# Patient Record
Sex: MALE | Race: WHITE | Employment: FULL TIME | URBAN - METROPOLITAN AREA
[De-identification: names, ages, dates, MRNs, and addresses within clinical notes are randomized per-mention and may not be internally consistent; named-entity substitution may affect disease eponyms.]

---

## 2023-06-02 ENCOUNTER — HOSPITAL ENCOUNTER (INPATIENT)
Age: 58
LOS: 1 days | Discharge: HOME | DRG: 918 | End: 2023-06-03
Attending: EMERGENCY MEDICINE | Admitting: FAMILY MEDICINE
Payer: COMMERCIAL

## 2023-06-02 ENCOUNTER — APPOINTMENT (OUTPATIENT)
Dept: GENERAL RADIOLOGY | Age: 58
DRG: 918 | End: 2023-06-02
Payer: COMMERCIAL

## 2023-06-02 DIAGNOSIS — N17.9 AKI (ACUTE KIDNEY INJURY) (HCC): ICD-10-CM

## 2023-06-02 DIAGNOSIS — T50.901A ACCIDENTAL OVERDOSE, INITIAL ENCOUNTER: Primary | ICD-10-CM

## 2023-06-02 DIAGNOSIS — E87.5 HYPERKALEMIA: ICD-10-CM

## 2023-06-02 LAB
AMPHET UR QL SCN: NEGATIVE
ANION GAP SERPL CALCULATED.3IONS-SCNC: 12 MMOL/L (ref 9–17)
BARBITURATES UR QL SCN: NEGATIVE
BASOPHILS # BLD: 0 K/UL (ref 0–0.2)
BASOPHILS NFR BLD: 1 % (ref 0–2)
BENZODIAZ UR QL: NEGATIVE
BUN SERPL-MCNC: 13 MG/DL (ref 6–20)
CALCIUM SERPL-MCNC: 8.5 MG/DL (ref 8.6–10.4)
CANNABINOID SCREEN URINE: POSITIVE
CHLORIDE SERPL-SCNC: 98 MMOL/L (ref 98–107)
CO2 SERPL-SCNC: 23 MMOL/L (ref 20–31)
COCAINE UR QL SCN: POSITIVE
CREAT SERPL-MCNC: 1.44 MG/DL (ref 0.7–1.2)
EKG ATRIAL RATE: 77 BPM
EKG P AXIS: 69 DEGREES
EKG P-R INTERVAL: 150 MS
EKG Q-T INTERVAL: 420 MS
EKG QRS DURATION: 100 MS
EKG QTC CALCULATION (BAZETT): 475 MS
EKG R AXIS: 55 DEGREES
EKG T AXIS: 63 DEGREES
EKG VENTRICULAR RATE: 77 BPM
EOSINOPHIL # BLD: 0 K/UL (ref 0–0.4)
EOSINOPHILS RELATIVE PERCENT: 0 % (ref 0–4)
ERYTHROCYTE [DISTWIDTH] IN BLOOD BY AUTOMATED COUNT: 13.5 % (ref 11.5–14.9)
FENTANYL URINE: POSITIVE
GFR SERPL CREATININE-BSD FRML MDRD: 57 ML/MIN/1.73M2
GLUCOSE BLD-MCNC: 171 MG/DL (ref 75–110)
GLUCOSE BLD-MCNC: 72 MG/DL (ref 75–110)
GLUCOSE BLD-MCNC: 89 MG/DL (ref 75–110)
GLUCOSE BLD-MCNC: 94 MG/DL (ref 75–110)
GLUCOSE SERPL-MCNC: 268 MG/DL (ref 70–99)
HCT VFR BLD AUTO: 46.5 % (ref 41–53)
HGB BLD-MCNC: 15.4 G/DL (ref 13.5–17.5)
INR PPP: 1
LYMPHOCYTES # BLD: 11 % (ref 24–44)
LYMPHOCYTES NFR BLD: 0.6 K/UL (ref 1–4.8)
MCH RBC QN AUTO: 29.5 PG (ref 26–34)
MCHC RBC AUTO-ENTMCNC: 33.1 G/DL (ref 31–37)
MCV RBC AUTO: 89.1 FL (ref 80–100)
METHADONE SCREEN, URINE: NEGATIVE
MONOCYTES NFR BLD: 0.2 K/UL (ref 0.1–1.3)
MONOCYTES NFR BLD: 3 % (ref 1–7)
MYOGLOBIN SERPL-MCNC: 102 NG/ML (ref 28–72)
NEUTROPHILS NFR BLD: 85 % (ref 36–66)
NEUTS SEG NFR BLD: 4.3 K/UL (ref 1.3–9.1)
OPIATES UR QL SCN: NEGATIVE
OXYCODONE SCREEN URINE: NEGATIVE
PARTIAL THROMBOPLASTIN TIME: 27.3 SEC (ref 24–36)
PCP UR QL SCN: NEGATIVE
PLATELET # BLD AUTO: 254 K/UL (ref 150–450)
PMV BLD AUTO: 9 FL (ref 6–12)
POTASSIUM SERPL-SCNC: 5.9 MMOL/L (ref 3.7–5.3)
PROTHROMBIN TIME: 13.6 SEC (ref 11.8–14.6)
RBC # BLD AUTO: 5.22 M/UL (ref 4.5–5.9)
SODIUM SERPL-SCNC: 133 MMOL/L (ref 135–144)
T4 FREE SERPL-MCNC: 1.1 NG/DL (ref 0.9–1.7)
TEST INFORMATION: ABNORMAL
TROPONIN I SERPL HS-MCNC: 20 NG/L (ref 0–22)
TSH SERPL-MCNC: 11.31 UIU/ML (ref 0.3–5)
WBC OTHER # BLD: 5.1 K/UL (ref 3.5–11)

## 2023-06-02 PROCEDURE — 2580000003 HC RX 258: Performed by: EMERGENCY MEDICINE

## 2023-06-02 PROCEDURE — 96374 THER/PROPH/DIAG INJ IV PUSH: CPT

## 2023-06-02 PROCEDURE — 96361 HYDRATE IV INFUSION ADD-ON: CPT

## 2023-06-02 PROCEDURE — 82947 ASSAY GLUCOSE BLOOD QUANT: CPT

## 2023-06-02 PROCEDURE — 2580000003 HC RX 258: Performed by: FAMILY MEDICINE

## 2023-06-02 PROCEDURE — 83874 ASSAY OF MYOGLOBIN: CPT

## 2023-06-02 PROCEDURE — 84484 ASSAY OF TROPONIN QUANT: CPT

## 2023-06-02 PROCEDURE — 6360000002 HC RX W HCPCS: Performed by: EMERGENCY MEDICINE

## 2023-06-02 PROCEDURE — 99285 EMERGENCY DEPT VISIT HI MDM: CPT

## 2023-06-02 PROCEDURE — 36415 COLL VENOUS BLD VENIPUNCTURE: CPT

## 2023-06-02 PROCEDURE — 84439 ASSAY OF FREE THYROXINE: CPT

## 2023-06-02 PROCEDURE — 6370000000 HC RX 637 (ALT 250 FOR IP): Performed by: FAMILY MEDICINE

## 2023-06-02 PROCEDURE — 80048 BASIC METABOLIC PNL TOTAL CA: CPT

## 2023-06-02 PROCEDURE — 5A12012 PERFORMANCE OF CARDIAC OUTPUT, SINGLE, MANUAL: ICD-10-PCS | Performed by: FAMILY MEDICINE

## 2023-06-02 PROCEDURE — 85730 THROMBOPLASTIN TIME PARTIAL: CPT

## 2023-06-02 PROCEDURE — 6370000000 HC RX 637 (ALT 250 FOR IP): Performed by: EMERGENCY MEDICINE

## 2023-06-02 PROCEDURE — 80307 DRUG TEST PRSMV CHEM ANLYZR: CPT

## 2023-06-02 PROCEDURE — 2060000000 HC ICU INTERMEDIATE R&B

## 2023-06-02 PROCEDURE — 85027 COMPLETE CBC AUTOMATED: CPT

## 2023-06-02 PROCEDURE — 84443 ASSAY THYROID STIM HORMONE: CPT

## 2023-06-02 PROCEDURE — 93005 ELECTROCARDIOGRAM TRACING: CPT

## 2023-06-02 PROCEDURE — 71045 X-RAY EXAM CHEST 1 VIEW: CPT

## 2023-06-02 PROCEDURE — 85610 PROTHROMBIN TIME: CPT

## 2023-06-02 RX ORDER — CLOTRIMAZOLE 1 %
CREAM (GRAM) TOPICAL 2 TIMES DAILY
Status: DISCONTINUED | OUTPATIENT
Start: 2023-06-02 | End: 2023-06-03 | Stop reason: HOSPADM

## 2023-06-02 RX ORDER — SUMATRIPTAN 25 MG/1
25 TABLET, FILM COATED ORAL
COMMUNITY

## 2023-06-02 RX ORDER — ACETAMINOPHEN 325 MG/1
650 TABLET ORAL EVERY 6 HOURS PRN
Status: DISCONTINUED | OUTPATIENT
Start: 2023-06-02 | End: 2023-06-03 | Stop reason: HOSPADM

## 2023-06-02 RX ORDER — LORAZEPAM 1 MG/1
1 TABLET ORAL DAILY
Status: DISCONTINUED | OUTPATIENT
Start: 2023-06-02 | End: 2023-06-03 | Stop reason: HOSPADM

## 2023-06-02 RX ORDER — SODIUM CHLORIDE 9 MG/ML
INJECTION, SOLUTION INTRAVENOUS CONTINUOUS
Status: DISCONTINUED | OUTPATIENT
Start: 2023-06-02 | End: 2023-06-03 | Stop reason: HOSPADM

## 2023-06-02 RX ORDER — 0.9 % SODIUM CHLORIDE 0.9 %
1000 INTRAVENOUS SOLUTION INTRAVENOUS ONCE
Status: COMPLETED | OUTPATIENT
Start: 2023-06-02 | End: 2023-06-02

## 2023-06-02 RX ORDER — NADOLOL 20 MG/1
20 TABLET ORAL DAILY
Status: DISCONTINUED | OUTPATIENT
Start: 2023-06-02 | End: 2023-06-03 | Stop reason: HOSPADM

## 2023-06-02 RX ORDER — CHLORHEXIDINE GLUCONATE 0.12 MG/ML
15 RINSE ORAL 2 TIMES DAILY
Status: ON HOLD | COMMUNITY
End: 2023-06-02

## 2023-06-02 RX ORDER — ONDANSETRON 2 MG/ML
4 INJECTION INTRAMUSCULAR; INTRAVENOUS ONCE
Status: COMPLETED | OUTPATIENT
Start: 2023-06-02 | End: 2023-06-02

## 2023-06-02 RX ORDER — LORAZEPAM 1 MG/1
1 TABLET ORAL DAILY
COMMUNITY

## 2023-06-02 RX ORDER — METOCLOPRAMIDE HYDROCHLORIDE 5 MG/ML
10 INJECTION INTRAMUSCULAR; INTRAVENOUS ONCE
Status: COMPLETED | OUTPATIENT
Start: 2023-06-02 | End: 2023-06-02

## 2023-06-02 RX ORDER — SUMATRIPTAN 25 MG/1
25 TABLET, FILM COATED ORAL
Status: COMPLETED | OUTPATIENT
Start: 2023-06-02 | End: 2023-06-02

## 2023-06-02 RX ORDER — NADOLOL 20 MG/1
20 TABLET ORAL DAILY
COMMUNITY

## 2023-06-02 RX ADMIN — METOCLOPRAMIDE 10 MG: 5 INJECTION, SOLUTION INTRAMUSCULAR; INTRAVENOUS at 13:51

## 2023-06-02 RX ADMIN — SUMATRIPTAN SUCCINATE 25 MG: 25 TABLET ORAL at 20:47

## 2023-06-02 RX ADMIN — SODIUM CHLORIDE 1000 ML: 9 INJECTION, SOLUTION INTRAVENOUS at 13:48

## 2023-06-02 RX ADMIN — LORAZEPAM 1 MG: 1 TABLET ORAL at 20:45

## 2023-06-02 RX ADMIN — INSULIN HUMAN 10 UNITS: 100 INJECTION, SOLUTION PARENTERAL at 13:47

## 2023-06-02 RX ADMIN — SODIUM CHLORIDE 1000 ML: 9 INJECTION, SOLUTION INTRAVENOUS at 12:56

## 2023-06-02 RX ADMIN — CLOTRIMAZOLE: 10 CREAM TOPICAL at 20:45

## 2023-06-02 RX ADMIN — DEXTROSE MONOHYDRATE 250 ML: 100 INJECTION, SOLUTION INTRAVENOUS at 13:49

## 2023-06-02 RX ADMIN — NADOLOL 20 MG: 20 TABLET ORAL at 20:45

## 2023-06-02 RX ADMIN — SODIUM CHLORIDE: 9 INJECTION, SOLUTION INTRAVENOUS at 20:51

## 2023-06-02 RX ADMIN — ONDANSETRON 4 MG: 2 INJECTION INTRAMUSCULAR; INTRAVENOUS at 12:54

## 2023-06-02 ASSESSMENT — ENCOUNTER SYMPTOMS
ABDOMINAL PAIN: 0
EYE DISCHARGE: 0
FACIAL SWELLING: 0
EYE PAIN: 0
RHINORRHEA: 0
BLOOD IN STOOL: 0
SHORTNESS OF BREATH: 0
EYE REDNESS: 0
COUGH: 0
CHEST TIGHTNESS: 0
SINUS PRESSURE: 0
NAUSEA: 1
VOMITING: 0
TROUBLE SWALLOWING: 0
WHEEZING: 0
DIARRHEA: 0
SORE THROAT: 0
BACK PAIN: 0
CONSTIPATION: 0
COLOR CHANGE: 0

## 2023-06-02 ASSESSMENT — PAIN SCALES - GENERAL: PAINLEVEL_OUTOF10: 9

## 2023-06-02 ASSESSMENT — LIFESTYLE VARIABLES
HOW MANY STANDARD DRINKS CONTAINING ALCOHOL DO YOU HAVE ON A TYPICAL DAY: 1 OR 2
HOW OFTEN DO YOU HAVE A DRINK CONTAINING ALCOHOL: MONTHLY OR LESS
HOW OFTEN DO YOU HAVE A DRINK CONTAINING ALCOHOL: NEVER
HOW MANY STANDARD DRINKS CONTAINING ALCOHOL DO YOU HAVE ON A TYPICAL DAY: PATIENT DOES NOT DRINK

## 2023-06-02 ASSESSMENT — PAIN DESCRIPTION - DESCRIPTORS: DESCRIPTORS: ACHING

## 2023-06-02 ASSESSMENT — PAIN DESCRIPTION - LOCATION: LOCATION: HEAD

## 2023-06-02 ASSESSMENT — PAIN - FUNCTIONAL ASSESSMENT: PAIN_FUNCTIONAL_ASSESSMENT: NONE - DENIES PAIN

## 2023-06-02 ASSESSMENT — PAIN DESCRIPTION - PAIN TYPE: TYPE: ACUTE PAIN

## 2023-06-02 NOTE — PROGRESS NOTES
Medication History completed:    New medications: chlorhexidine rinse, lorazepam, nadolol, sumatriptan    Medications discontinued: none    Medications flagged for review: none    Changes to dosing: none    Stated allergies: NKDA    Other pertinent information: Medications confirmed with [facility] dispense report and [facility]. Last fill of lorazepam was on 4/30/23 for 30 days.      Thank you,  Caleb Johnston, PharmD, BCPS  [phone]

## 2023-06-02 NOTE — PROGRESS NOTES
Pt arrived to floor via stretcher from ED and was transfered to bed. Vitals taken, telemetry applied. Assessment complete. No distress noted. See doc flowsheet and admission navigator for details. POC and education initiated and reviewed with patient. Call light within reach, and pt educated on its use. Bed in lowest position, and locked. Side rails up x 2. Denied further questions or needs at this time. Will continue to monitor.

## 2023-06-02 NOTE — ED NOTES
Report given to GELY Paz, RN from PCU. Report method by phone   The following was reviewed with receiving RN:   Current vital signs:  BP (!) 125/90   Pulse 77   Temp 98.5 °F (36.9 °C) (Oral)   Resp 16   Ht 6' 1\" (1.854 m)   Wt 150 lb (68 kg)   SpO2 98%   BMI 19.79 kg/m²                MEWS Score: 1     Any medication or safety alerts were reviewed. Any pending diagnostics and notifications were also reviewed, as well as any safety concerns or issues, abnormal labs, abnormal imaging, and abnormal assessment findings. Questions were answered.           Bharat Page RN  06/02/23 7711

## 2023-06-03 VITALS
HEIGHT: 73 IN | RESPIRATION RATE: 18 BRPM | SYSTOLIC BLOOD PRESSURE: 146 MMHG | OXYGEN SATURATION: 96 % | BODY MASS INDEX: 19.88 KG/M2 | HEART RATE: 55 BPM | TEMPERATURE: 98.6 F | DIASTOLIC BLOOD PRESSURE: 98 MMHG | WEIGHT: 150 LBS

## 2023-06-03 LAB
ANION GAP SERPL CALCULATED.3IONS-SCNC: 11 MMOL/L (ref 9–17)
BUN SERPL-MCNC: 14 MG/DL (ref 6–20)
CALCIUM SERPL-MCNC: 8.6 MG/DL (ref 8.6–10.4)
CHLORIDE SERPL-SCNC: 99 MMOL/L (ref 98–107)
CO2 SERPL-SCNC: 27 MMOL/L (ref 20–31)
CREAT SERPL-MCNC: 0.98 MG/DL (ref 0.7–1.2)
GFR SERPL CREATININE-BSD FRML MDRD: >60 ML/MIN/1.73M2
GLUCOSE BLD-MCNC: 109 MG/DL (ref 75–110)
GLUCOSE BLD-MCNC: 117 MG/DL (ref 75–110)
GLUCOSE BLD-MCNC: 86 MG/DL (ref 75–110)
GLUCOSE BLD-MCNC: 98 MG/DL (ref 75–110)
GLUCOSE SERPL-MCNC: 91 MG/DL (ref 70–99)
POTASSIUM SERPL-SCNC: 4 MMOL/L (ref 3.7–5.3)
SODIUM SERPL-SCNC: 137 MMOL/L (ref 135–144)
T3 SERPL-MCNC: 80 NG/DL (ref 60–181)
T4 SERPL-MCNC: 5.1 UG/DL (ref 4.5–10.9)
TROPONIN I SERPL HS-MCNC: 32 NG/L (ref 0–22)
TSH SERPL-MCNC: 3 UIU/ML (ref 0.3–5)

## 2023-06-03 PROCEDURE — 80048 BASIC METABOLIC PNL TOTAL CA: CPT

## 2023-06-03 PROCEDURE — 84443 ASSAY THYROID STIM HORMONE: CPT

## 2023-06-03 PROCEDURE — 82947 ASSAY GLUCOSE BLOOD QUANT: CPT

## 2023-06-03 PROCEDURE — 6370000000 HC RX 637 (ALT 250 FOR IP): Performed by: FAMILY MEDICINE

## 2023-06-03 PROCEDURE — 84484 ASSAY OF TROPONIN QUANT: CPT

## 2023-06-03 PROCEDURE — 84436 ASSAY OF TOTAL THYROXINE: CPT

## 2023-06-03 PROCEDURE — 2580000003 HC RX 258: Performed by: FAMILY MEDICINE

## 2023-06-03 PROCEDURE — 36415 COLL VENOUS BLD VENIPUNCTURE: CPT

## 2023-06-03 PROCEDURE — 84480 ASSAY TRIIODOTHYRONINE (T3): CPT

## 2023-06-03 RX ORDER — CLOTRIMAZOLE 1 %
CREAM (GRAM) TOPICAL
Qty: 30 G | Refills: 1 | Status: SHIPPED | OUTPATIENT
Start: 2023-06-03 | End: 2023-06-10

## 2023-06-03 RX ORDER — SUMATRIPTAN 25 MG/1
25 TABLET, FILM COATED ORAL DAILY PRN
Status: DISCONTINUED | OUTPATIENT
Start: 2023-06-03 | End: 2023-06-03 | Stop reason: HOSPADM

## 2023-06-03 RX ORDER — SUMATRIPTAN 25 MG/1
25 TABLET, FILM COATED ORAL ONCE
Status: COMPLETED | OUTPATIENT
Start: 2023-06-03 | End: 2023-06-03

## 2023-06-03 RX ADMIN — NADOLOL 20 MG: 20 TABLET ORAL at 08:29

## 2023-06-03 RX ADMIN — LORAZEPAM 1 MG: 1 TABLET ORAL at 08:27

## 2023-06-03 RX ADMIN — SUMATRIPTAN SUCCINATE 25 MG: 25 TABLET ORAL at 13:02

## 2023-06-03 RX ADMIN — ACETAMINOPHEN 650 MG: 325 TABLET ORAL at 08:27

## 2023-06-03 RX ADMIN — CLOTRIMAZOLE: 10 CREAM TOPICAL at 08:27

## 2023-06-03 RX ADMIN — SODIUM CHLORIDE: 9 INJECTION, SOLUTION INTRAVENOUS at 10:53

## 2023-06-03 ASSESSMENT — PAIN SCALES - GENERAL
PAINLEVEL_OUTOF10: 5
PAINLEVEL_OUTOF10: 8
PAINLEVEL_OUTOF10: 7
PAINLEVEL_OUTOF10: 7

## 2023-06-03 ASSESSMENT — PAIN DESCRIPTION - DESCRIPTORS: DESCRIPTORS: BURNING;ACHING

## 2023-06-03 ASSESSMENT — PAIN DESCRIPTION - LOCATION: LOCATION: THROAT

## 2023-06-03 NOTE — CARE COORDINATION
Case Management Assessment  Initial Evaluation    Date/Time of Evaluation: 6/3/2023 3:41 PM  Assessment Completed by: Cuba Hugo RN    If patient is discharged prior to next notation, then this note serves as note for discharge by case management. Patient Name: Cameron Aschoff                   YOB: 1965  Diagnosis: Hyperkalemia [E87.5]  SHU (acute kidney injury) ([facility] [address] [N17.9]  Accidental overdose, initial encounter [T50.901A]  Drug overdose, accidental or unintentional, initial encounter Erikphuong Mora                   Date / Time: 6/2/2023 12:45 PM    Patient Admission Status: Inpatient   Readmission Risk (Low < 19, Mod (19-27), High > 27): Readmission Risk Score: 5.1    Current PCP: Mansi Escalante MD  PCP verified by CM? Yes    Chart Reviewed: Yes      History Provided by: Patient  Patient Orientation: Alert and Oriented    Patient Cognition: Alert    Hospitalization in the last 30 days (Readmission):  No    If yes, Readmission Assessment in  Navigator will be completed. Advance Directives:      Code Status: Not on file   Patient's Primary Decision Maker is: Legal Next of Kin      Discharge Planning:    Patient lives with: Children (daughter) Type of Home: House  Primary Care Giver: Self  Patient Support Systems include: Children   Current Financial resources: None  Current community resources: None  Current services prior to admission: None            Current DME:              Type of Home Care services:  None    ADLS  Prior functional level: Independent in ADLs/IADLs  Current functional level: Independent in ADLs/IADLs    PT AM-PAC:   /24  OT AM-PAC:   /24    Family can provide assistance at DC: Would you like Case Management to discuss the discharge plan with any other family members/significant others, and if so, who?  No  Plans to Return to Present Housing: Yes  Other Identified Issues/Barriers to RETURNING to current housing: no barriers  Potential Assistance needed at

## 2023-06-03 NOTE — CONSULTS
Single    CHILDREN: Lives with his adult daughter. States that their relationship is struggling . 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 Past Medical History:    History reviewed. No pertinent past medical history. Past Surgical History:    History reviewed. No pertinent surgical history. Family Medical and Psychiatric History:     His brother completed suicide in 2019  Reports his daughter is bipolar        Problem Relation Age of Onset    Diabetes Mother        Medications Prior to Admission:   Medications Prior to Admission: SUMAtriptan (IMITREX) 25 MG tablet, Take 1 tablet by mouth once as needed for Migraine  nadolol (CORGARD) 20 MG tablet, Take 1 tablet by mouth daily  LORazepam (ATIVAN) 1 MG tablet, Take 1 tablet by mouth daily. Indications: last filled 4/30/23 for 30 days  [DISCONTINUED] chlorhexidine (PERIDEX) 0.12 % solution, Swish and spit 15 mLs 2 times daily (Patient not taking: Reported on 6/2/2023)    Allergies:  Patient has no known allergies. Lifetime Psychiatric Review of Systems         Obsessions and Compulsions: Denies       Jacqueline or Hypomania: Denies     Hallucinations: Denies     Panic Attacks:  Denies     Delusions:  Denies     Phobias:  Denies     Trauma: Denies    Prior to Admission medications    Medication Sig Start Date End Date Taking? Authorizing Provider   SUMAtriptan (IMITREX) 25 MG tablet Take 1 tablet by mouth once as needed for Migraine   Yes Historical Provider, MD   nadolol (CORGARD) 20 MG tablet Take 1 tablet by mouth daily   Yes Historical Provider, MD   LORazepam (ATIVAN) 1 MG tablet Take 1 tablet by mouth daily.  Indications: last filled 4/30/23 for 30 days   Yes Historical Provider, MD        Medications:    Current Facility-Administered Medications: SUMAtriptan (IMITREX) tablet 25 mg, 25 mg, Oral, Daily PRN  nadolol (CORGARD) tablet 20 mg, 20 mg, Oral, Daily  LORazepam (ATIVAN) tablet 1

## 2023-06-03 NOTE — H&P
[facility]        History and Physical Examination     CHIEF COMPLAINT: Unresponsive, Agonal breathing    Reason for Admission: Drug Overdose and Resp distress    History Obtained From:  Patient and ER notes    HISTORY OF PRESENT ILLNESS:      The patient is a  65 y.o. male with significant medical history of Crack Cocaine abuse/ Alcohol Abuse/Marijuana Abuse for over 30 yrs who has never gotten help for it was found unresponsive at home with agonal breathing, he was resuscitated by bystanders and then later by EMS with CPR and several rounds of Narcan and brought to ER, he was stable in the ER, his w/u was essentially neg. Pt was admitted for Observation. Pt was sleepy but had no other issues. Pt feels fine this am and confirms that his crack was probably laced with Fentanyl and he uses Marijuana gummies and has 30 yr crack addiction on and off, binge drinking on and off for 30 yrs , gambling addiction on and off for a long time. All above addiction's got worse since he retired at age 49. Past Medical History:    VASU  Insomnia  Cory HA    Past Surgical History:    History reviewed. No pertinent surgical history. Medications Prior to Admission:    Medications Prior to Admission: SUMAtriptan (IMITREX) 25 MG tablet, Take 1 tablet by mouth once as needed for Migraine  nadolol (CORGARD) 20 MG tablet, Take 1 tablet by mouth daily  LORazepam (ATIVAN) 1 MG tablet, Take 1 tablet by mouth daily. Indications: last filled 4/30/23 for 30 days  [DISCONTINUED] chlorhexidine (PERIDEX) 0.12 % solution, Swish and spit 15 mLs 2 times daily (Patient not taking: Reported on 6/2/2023)    Allergies:  Patient has no known allergies. Immunizations: There is no immunization history on file for this patient. Social History: Single, retired, Crack/ETOH/Marijuana addiction. Gambling addiction.       Family History:       Problem Relation Age of Onset    Diabetes Mother        REVIEW OF SYSTEMS:  Constitutional:

## 2023-06-06 LAB
EKG ATRIAL RATE: 77 BPM
EKG P AXIS: 69 DEGREES
EKG P-R INTERVAL: 150 MS
EKG Q-T INTERVAL: 420 MS
EKG QRS DURATION: 100 MS
EKG QTC CALCULATION (BAZETT): 475 MS
EKG R AXIS: 55 DEGREES
EKG T AXIS: 63 DEGREES
EKG VENTRICULAR RATE: 77 BPM